# Patient Record
Sex: MALE | Race: WHITE | NOT HISPANIC OR LATINO | Employment: FULL TIME | URBAN - METROPOLITAN AREA
[De-identification: names, ages, dates, MRNs, and addresses within clinical notes are randomized per-mention and may not be internally consistent; named-entity substitution may affect disease eponyms.]

---

## 2018-11-14 ENCOUNTER — OFFICE VISIT (OUTPATIENT)
Dept: INTERNAL MEDICINE CLINIC | Facility: CLINIC | Age: 38
End: 2018-11-14
Payer: COMMERCIAL

## 2018-11-14 VITALS
WEIGHT: 238 LBS | HEIGHT: 72 IN | HEART RATE: 98 BPM | SYSTOLIC BLOOD PRESSURE: 124 MMHG | BODY MASS INDEX: 32.23 KG/M2 | TEMPERATURE: 97.7 F | OXYGEN SATURATION: 99 % | DIASTOLIC BLOOD PRESSURE: 88 MMHG

## 2018-11-14 DIAGNOSIS — Z02.1 PRE-EMPLOYMENT EXAMINATION: Primary | ICD-10-CM

## 2018-11-14 PROBLEM — J30.89 PERENNIAL ALLERGIC RHINITIS: Status: ACTIVE | Noted: 2017-01-16

## 2018-11-14 PROBLEM — G47.26 CIRCADIAN RHYTHM SLEEP DISORDER, SHIFT WORK TYPE: Status: ACTIVE | Noted: 2017-08-31

## 2018-11-14 PROCEDURE — 99396 PREV VISIT EST AGE 40-64: CPT | Performed by: INTERNAL MEDICINE

## 2018-11-14 RX ORDER — MONTELUKAST SODIUM 10 MG/1
1 TABLET ORAL DAILY
Refills: 1 | COMMUNITY
Start: 2018-10-10

## 2018-11-14 RX ORDER — ZOLPIDEM TARTRATE 6.25 MG/1
1 TABLET, FILM COATED, EXTENDED RELEASE ORAL
Refills: 1 | COMMUNITY
Start: 2018-10-30

## 2018-11-14 RX ORDER — LISINOPRIL 20 MG/1
20 TABLET ORAL DAILY
Refills: 3 | COMMUNITY
Start: 2018-08-31

## 2018-11-14 RX ORDER — CYCLOBENZAPRINE HCL 10 MG
1 TABLET ORAL
Refills: 0 | COMMUNITY
Start: 2018-10-21

## 2018-11-14 NOTE — PATIENT INSTRUCTIONS
Paperwork was completed to certify the patient is being physically capable of activities demanded by his employment  Urine drug screen is still pending and will be due reported directly to his employer

## 2018-11-14 NOTE — ASSESSMENT & PLAN NOTE
Patient appears physically well  He has controlled hypertension  Mild obesity with a BMI of 32 28  Patient is status post bariatric surgery for morbid obesity apparently is doing quite well  He has a remote history of lumbar back pain which has been stable

## 2018-11-14 NOTE — PROGRESS NOTES
Assessment/Plan:    1  Pre-employment physical:  - patient appears well and has no significant mobility restrictions (H&P wnl)  - only health concerns are well-managed with medications or (HTN - lisinopril, insomnia - ambien, intermittent back pain - stretching)  - drug screen not done at this facility  - paperwork was signed and patient cleared for work      Subjective:      Patient ID: Cammie Grullon is a 34 y o  male  Patient is presenting for pre-employment physical  Only ongoing health issue is elevated BP which is well-controlled with lisinopril (running around 120s/80s-90s) he sees PCP regularly every 6 mo for this; no issues s/p bariatric surgery but endorses > 100 lb weight loss; patient endorses seasonal allergies and takes singulair  Patient is taking the following medications: lisinopril, singulair, ambien, doxycycline (acne), flexeril prn (1-2x/month)  Patient endorses no significant mobility restriction and tries to keep up with physical activity which he describes as regular walking for exercise during work and free weights at home (1-2x/week); diet is mostly protein, fairly well-balanced though still somewhat lacking in fruits and vegetables but is much improved from prior to the bariatric surgery  Patient is sexually active w/ females, denies history of STDs  He endorses very infrequent alcohol use (1-2 drinks/week, light drinker) and a history of smoking (1ppd since high school but quit 9 years ago, since then occasional cigars)  He denies any illicit drug use  He denies any psychiatric history (PHQ2: 0)  Review of Systems   Constitutional: Negative for activity change, appetite change, chills, diaphoresis, fatigue, fever and unexpected weight change  HENT: Positive for rhinorrhea and sinus pressure  Negative for congestion, ear pain, sinus pain, sore throat, tinnitus and voice change  Eyes: Negative for visual disturbance          Wears glasses for far-sightedness   Respiratory: Negative for cough, chest tightness and shortness of breath  Cardiovascular: Negative for chest pain and leg swelling  Gastrointestinal: Positive for constipation  Negative for abdominal pain, diarrhea, nausea and vomiting  Constipation mostly SE from bariatric: has BM 2x/week no discomfort;    Endocrine: Negative for cold intolerance, heat intolerance, polydipsia and polyuria  Genitourinary: Negative for difficulty urinating, dysuria, hematuria and testicular pain  Musculoskeletal: Positive for back pain  Negative for myalgias, neck pain and neck stiffness  Back (lumbar) injury 2003, flares up every once in a while -  usually NSAIDs will help;    Skin: Negative for rash  Allergic/Immunologic:        Seasonal   Neurological: Positive for headaches  Negative for dizziness, tremors, seizures, syncope, weakness and light-headedness  Headaches normal tension headaches only ~1x/month uses tylenol prn   Hematological: Negative for adenopathy  Does not bruise/bleed easily  Psychiatric/Behavioral: Positive for sleep disturbance  Negative for confusion and hallucinations  Sleep disturbance - bad insomnia 2/2 shift work, [employer] is effective       Objective:    /88 (BP Location: Right arm, Patient Position: Sitting, Cuff Size: Large)   Pulse 98   Temp 97 7 °F (36 5 °C) (Oral)   Ht 6' (1 829 m)   Wt 108 kg (238 lb)   SpO2 99%   BMI 32 28 kg/m²        Physical Exam   Constitutional: He is oriented to person, place, and time  He appears well-developed and well-nourished  No distress  HENT:   Head: Normocephalic and atraumatic  Mouth/Throat: Oropharynx is clear and moist  No oropharyngeal exudate  Eyes: Pupils are equal, round, and reactive to light  Conjunctivae are normal  Right eye exhibits no discharge  Left eye exhibits no discharge  No scleral icterus  Neck: Normal range of motion  Neck supple  No JVD present     Cardiovascular: Normal rate, regular rhythm, normal heart sounds and intact distal pulses  Exam reveals no gallop and no friction rub  No murmur heard  Pulmonary/Chest: Effort normal and breath sounds normal  No respiratory distress  He has no wheezes  He has no rales  He exhibits no tenderness  Abdominal: Bowel sounds are normal  He exhibits no distension  There is no tenderness  There is no rebound and no guarding  Musculoskeletal: Normal range of motion  He exhibits no edema or deformity  Lymphadenopathy:     He has no cervical adenopathy  Neurological: He is alert and oriented to person, place, and time  No cranial nerve deficit  Coordination normal    Skin: Skin is warm and dry  No rash noted  Psychiatric: He has a normal mood and affect   His behavior is normal  Judgment and thought content normal

## 2021-11-21 ENCOUNTER — HOSPITAL ENCOUNTER (EMERGENCY)
Facility: HOSPITAL | Age: 41
Discharge: HOME | End: 2021-11-21
Attending: EMERGENCY MEDICINE | Admitting: EMERGENCY MEDICINE
Payer: OTHER MISCELLANEOUS

## 2021-11-21 VITALS
BODY MASS INDEX: 35.78 KG/M2 | DIASTOLIC BLOOD PRESSURE: 89 MMHG | SYSTOLIC BLOOD PRESSURE: 153 MMHG | RESPIRATION RATE: 16 BRPM | HEIGHT: 73 IN | WEIGHT: 270 LBS | OXYGEN SATURATION: 97 % | HEART RATE: 99 BPM | TEMPERATURE: 98 F

## 2021-11-21 DIAGNOSIS — M79.604 RIGHT LEG PAIN: Primary | ICD-10-CM

## 2021-11-21 DIAGNOSIS — M79.661 TENDERNESS OF RIGHT CALF: ICD-10-CM

## 2021-11-21 PROCEDURE — 99284 EMERGENCY DEPT VISIT MOD MDM: CPT

## 2021-11-21 PROCEDURE — 99284 EMERGENCY DEPT VISIT MOD MDM: CPT | Performed by: EMERGENCY MEDICINE

## 2021-11-21 PROCEDURE — 96374 THER/PROPH/DIAG INJ IV PUSH: CPT

## 2021-11-21 RX ORDER — OXYCODONE HYDROCHLORIDE 5 MG/1
5 TABLET ORAL EVERY 4 HOURS PRN
Qty: 6 TABLET | Refills: 0 | Status: SHIPPED | OUTPATIENT
Start: 2021-11-21

## 2021-11-21 RX ORDER — KETOROLAC TROMETHAMINE 30 MG/ML
15 INJECTION, SOLUTION INTRAMUSCULAR; INTRAVENOUS ONCE
Status: COMPLETED | OUTPATIENT
Start: 2021-11-21 | End: 2021-11-21

## 2021-11-21 RX ORDER — OXYCODONE HYDROCHLORIDE 5 MG/1
5 TABLET ORAL EVERY 4 HOURS PRN
Qty: 6 TABLET | Refills: 0 | Status: SHIPPED | OUTPATIENT
Start: 2021-11-21 | End: 2021-11-21 | Stop reason: SDUPTHER

## 2021-11-21 RX ORDER — FENTANYL CITRATE 50 UG/ML
1 INJECTION, SOLUTION INTRAMUSCULAR; INTRAVENOUS ONCE
Status: COMPLETED | OUTPATIENT
Start: 2021-11-21 | End: 2021-11-21

## 2021-11-21 RX ADMIN — KETOROLAC TROMETHAMINE 15 MG: 30 INJECTION, SOLUTION INTRAMUSCULAR at 05:15
